# Patient Record
Sex: FEMALE | Race: WHITE | Employment: FULL TIME | URBAN - METROPOLITAN AREA
[De-identification: names, ages, dates, MRNs, and addresses within clinical notes are randomized per-mention and may not be internally consistent; named-entity substitution may affect disease eponyms.]

---

## 2018-03-15 ENCOUNTER — HOSPITAL ENCOUNTER (OUTPATIENT)
Dept: MRI IMAGING | Age: 48
Discharge: HOME | End: 2018-03-15
Attending: INTERNAL MEDICINE | Admitting: INTERNAL MEDICINE

## 2018-03-15 DIAGNOSIS — M48.00 SPINAL STENOSIS, UNSPECIFIED SPINAL REGION: ICD-10-CM

## 2018-03-15 DIAGNOSIS — M48.00 SPINAL STENOSIS: ICD-10-CM

## 2019-06-28 ENCOUNTER — HOSPITAL ENCOUNTER (OUTPATIENT)
Dept: MAMMOGRAPHY | Age: 49
Discharge: HOME | End: 2019-07-03
Payer: COMMERCIAL

## 2019-06-28 DIAGNOSIS — Z12.31 VISIT FOR SCREENING MAMMOGRAM: ICD-10-CM

## 2019-06-28 PROCEDURE — 77067 SCR MAMMO BI INCL CAD: CPT

## 2020-11-03 PROBLEM — R07.9 CHEST PAIN: Status: RESOLVED | Noted: 2020-11-03 | Resolved: 2020-11-03

## 2021-02-25 ENCOUNTER — HOSPITAL ENCOUNTER (OUTPATIENT)
Dept: WOMENS IMAGING | Age: 51
Discharge: HOME | End: 2021-02-25
Payer: COMMERCIAL

## 2021-02-25 DIAGNOSIS — Z12.31 BREAST CANCER SCREENING BY MAMMOGRAM: ICD-10-CM

## 2021-02-25 PROCEDURE — 77063 BREAST TOMOSYNTHESIS BI: CPT

## 2023-03-02 SDOH — HEALTH STABILITY: PHYSICAL HEALTH: ON AVERAGE, HOW MANY DAYS PER WEEK DO YOU ENGAGE IN MODERATE TO STRENUOUS EXERCISE (LIKE A BRISK WALK)?: 1 DAY

## 2023-03-02 ASSESSMENT — SOCIAL DETERMINANTS OF HEALTH (SDOH)
WITHIN THE LAST YEAR, HAVE YOU BEEN KICKED, HIT, SLAPPED, OR OTHERWISE PHYSICALLY HURT BY YOUR PARTNER OR EX-PARTNER?: NO
WITHIN THE LAST YEAR, HAVE YOU BEEN HUMILIATED OR EMOTIONALLY ABUSED IN OTHER WAYS BY YOUR PARTNER OR EX-PARTNER?: YES
WITHIN THE LAST YEAR, HAVE YOU BEEN AFRAID OF YOUR PARTNER OR EX-PARTNER?: NO
WITHIN THE LAST YEAR, HAVE TO BEEN RAPED OR FORCED TO HAVE ANY KIND OF SEXUAL ACTIVITY BY YOUR PARTNER OR EX-PARTNER?: NO

## 2023-03-03 ENCOUNTER — OFFICE VISIT (OUTPATIENT)
Dept: INTERNAL MEDICINE CLINIC | Age: 53
End: 2023-03-03

## 2023-03-03 VITALS
OXYGEN SATURATION: 95 % | HEIGHT: 66 IN | DIASTOLIC BLOOD PRESSURE: 100 MMHG | BODY MASS INDEX: 35.87 KG/M2 | SYSTOLIC BLOOD PRESSURE: 152 MMHG | HEART RATE: 81 BPM | WEIGHT: 223.2 LBS

## 2023-03-03 DIAGNOSIS — M50.30 DDD (DEGENERATIVE DISC DISEASE), CERVICAL: ICD-10-CM

## 2023-03-03 DIAGNOSIS — Z72.0 TOBACCO USE: ICD-10-CM

## 2023-03-03 DIAGNOSIS — F41.1 GAD (GENERALIZED ANXIETY DISORDER): ICD-10-CM

## 2023-03-03 DIAGNOSIS — I10 ESSENTIAL HYPERTENSION: ICD-10-CM

## 2023-03-03 DIAGNOSIS — M25.562 ARTHRALGIA OF KNEE, LEFT: ICD-10-CM

## 2023-03-03 DIAGNOSIS — N95.1 VASOMOTOR SYMPTOMS DUE TO MENOPAUSE: ICD-10-CM

## 2023-03-03 DIAGNOSIS — Z12.31 BREAST CANCER SCREENING BY MAMMOGRAM: ICD-10-CM

## 2023-03-03 DIAGNOSIS — Z00.00 ANNUAL PHYSICAL EXAM: Primary | ICD-10-CM

## 2023-03-03 DIAGNOSIS — E66.9 OBESITY (BMI 30-39.9): ICD-10-CM

## 2023-03-03 DIAGNOSIS — Z12.11 COLON CANCER SCREENING: ICD-10-CM

## 2023-03-03 RX ORDER — VALSARTAN AND HYDROCHLOROTHIAZIDE 160; 25 MG/1; MG/1
1 TABLET ORAL DAILY
Qty: 90 TABLET | Refills: 1 | Status: SHIPPED | OUTPATIENT
Start: 2023-03-03

## 2023-03-03 RX ORDER — BUPROPION HYDROCHLORIDE 75 MG/1
75 TABLET ORAL 2 TIMES DAILY
Qty: 60 TABLET | Refills: 3 | Status: SHIPPED | OUTPATIENT
Start: 2023-03-03

## 2023-03-03 SDOH — ECONOMIC STABILITY: INCOME INSECURITY: HOW HARD IS IT FOR YOU TO PAY FOR THE VERY BASICS LIKE FOOD, HOUSING, MEDICAL CARE, AND HEATING?: NOT HARD AT ALL

## 2023-03-03 SDOH — ECONOMIC STABILITY: FOOD INSECURITY: WITHIN THE PAST 12 MONTHS, THE FOOD YOU BOUGHT JUST DIDN'T LAST AND YOU DIDN'T HAVE MONEY TO GET MORE.: NEVER TRUE

## 2023-03-03 SDOH — ECONOMIC STABILITY: FOOD INSECURITY: WITHIN THE PAST 12 MONTHS, YOU WORRIED THAT YOUR FOOD WOULD RUN OUT BEFORE YOU GOT MONEY TO BUY MORE.: NEVER TRUE

## 2023-03-03 SDOH — ECONOMIC STABILITY: HOUSING INSECURITY
IN THE LAST 12 MONTHS, WAS THERE A TIME WHEN YOU DID NOT HAVE A STEADY PLACE TO SLEEP OR SLEPT IN A SHELTER (INCLUDING NOW)?: NO

## 2023-03-03 ASSESSMENT — ENCOUNTER SYMPTOMS
SORE THROAT: 0
CONSTIPATION: 0
NAUSEA: 0
WHEEZING: 0
VOMITING: 0
CHEST TIGHTNESS: 0
SINUS PRESSURE: 0
COLOR CHANGE: 0
COUGH: 0
BACK PAIN: 0
ABDOMINAL PAIN: 0
SHORTNESS OF BREATH: 1

## 2023-03-03 ASSESSMENT — PATIENT HEALTH QUESTIONNAIRE - PHQ9
SUM OF ALL RESPONSES TO PHQ QUESTIONS 1-9: 0
2. FEELING DOWN, DEPRESSED OR HOPELESS: 0
1. LITTLE INTEREST OR PLEASURE IN DOING THINGS: 0
SUM OF ALL RESPONSES TO PHQ QUESTIONS 1-9: 0
SUM OF ALL RESPONSES TO PHQ9 QUESTIONS 1 & 2: 0
SUM OF ALL RESPONSES TO PHQ QUESTIONS 1-9: 0
SUM OF ALL RESPONSES TO PHQ QUESTIONS 1-9: 0

## 2023-03-03 NOTE — ASSESSMENT & PLAN NOTE
Patient has been dependent on Xanax for long time given to her by previous PCP, I had prolonged discussion and explained to patient office policy in regards to controlled substance prescriptions and need to avoid benzodiazepines for long-term management of an anxiety. Advised given the fact that she is dependent on it at this point would recommend gradually weaning off and introducing another noncontrolled substance. Patient is agreeable with the treatment plan, she was on Wellbutrin in the past and we discussed this will probably be a good option to retry since it will also help with nicotine cravings and hopefully help her with smoking cessation. She understand that she is going to start taking Xanax 3 times a day instead of 4 times for the next 2 weeks then she will drop to twice daily or cut the dose in half and continue gradually weaning down until able to come completely off, if needed I will provide 1 more refill however it will be half of her regular supply to last her until able to complete the weaning process.   We will start Wellbutrin 75 mg daily for the first week then she will upped the dose to twice daily and will reevaluate in 4 weeks

## 2023-03-03 NOTE — ASSESSMENT & PLAN NOTE
We will check x-ray to assess status of arthritis since symptoms are chronic and patient reports grinding sensation when taking stairs, recommendations for weight loss made to patient, can use as needed NSAIDs, can consider physical therapy, if x-ray consistent with significant arthritic changes will refer to orthopedic surgery to evaluate for intra-articular injections

## 2023-03-03 NOTE — ASSESSMENT & PLAN NOTE
Blood pressure checked twice and remained elevated, patient admits to occasionally forgetting some of her medications, recommended to use it weekly pill dispenser, reinforced recommendations for salt restriction, smoking cessation, weight reduction, healthy diet and regular exercise as other means to help control blood pressure, she will keep ambulatory blood pressure check and return with her readings to the office in 4 weeks, if still not at target goal with taking meds regularly then will need meds adjustment

## 2023-03-03 NOTE — ASSESSMENT & PLAN NOTE
Counseled at length regarding need for smoking cessation, she is aware of available options to help her quit, will start Wellbutrin as noted above to treat both anxiety and hopefully help with smoking cessation, reevaluate in 4 weeks

## 2023-03-03 NOTE — ASSESSMENT & PLAN NOTE
Patient reporting hot flashes, insomnia and mood swings along with brain fog, wants to know if she can go on estrogen replacement, explained to patient at length she is not a candidate for estrogen replacement given history of heavy smoking that will increase risk for thromboembolic events.   Counseled at length regarding smoking cessation, advised to ensure adequate hydration and exercise on regular basis, hopefully Wellbutrin will help with some of the symptoms, will refer to GYN for further evaluation and treatment options if needed

## 2023-03-03 NOTE — ASSESSMENT & PLAN NOTE
Age-related health maintenance and immunization recommendations reviewed and discussed with patient at length  Recommended to complete pneumonia vaccine given smoking history, update Tdap and flu vaccine in the fall.   Reports she will think about it and will update next visit if decides to go through  Labs ordered, healthy diet and regular exercise recommendations made to patient, BMI recommendations discussed with patient  Patient overdue for mammography and orders placed  Patient overdue for colonoscopy, GI referral placed  Patient overdue for Pap/pelvic exam, reports she has been postmenopausal for over a year now and battling hot flashes,  see below, she will schedule appointment with GYN to complete Pap  Depression screen is negative  Use of sunscreen and periodic skin exam recommendations made to patient

## 2023-03-03 NOTE — PROGRESS NOTES
ASSESSMENT/PLAN:  1. Annual physical exam  Assessment & Plan:   Age-related health maintenance and immunization recommendations reviewed and discussed with patient at length  Recommended to complete pneumonia vaccine given smoking history, update Tdap and flu vaccine in the fall. Reports she will think about it and will update next visit if decides to go through  Labs ordered, healthy diet and regular exercise recommendations made to patient, BMI recommendations discussed with patient  Patient overdue for mammography and orders placed  Patient overdue for colonoscopy, GI referral placed  Patient overdue for Pap/pelvic exam, reports she has been postmenopausal for over a year now and battling hot flashes,  see below, she will schedule appointment with GYN to complete Pap  Depression screen is negative  Use of sunscreen and periodic skin exam recommendations made to patient  Orders:  -     Hemoglobin A1C; Future  -     Lipid Panel; Future  -     CBC; Future  -     Comprehensive Metabolic Panel; Future  -     TSH with Reflex to FT4; Future  -     Urinalysis with Reflex to Culture; Future  -     Hepatitis C Antibody; Future  -     HIV Screen; Future  -     AFL - Mitul Deutsch MD, Gastroenterology, [facility]  2. Essential hypertension  Assessment & Plan:   Blood pressure checked twice and remained elevated, patient admits to occasionally forgetting some of her medications, recommended to use it weekly pill dispenser, reinforced recommendations for salt restriction, smoking cessation, weight reduction, healthy diet and regular exercise as other means to help control blood pressure, she will keep ambulatory blood pressure check and return with her readings to the office in 4 weeks, if still not at target goal with taking meds regularly then will need meds adjustment  Orders:  -     valsartan-hydroCHLOROthiazide (DIOVAN-HCT) 160-25 MG per tablet; Take 1 tablet by mouth daily, Disp-90 tablet, R-1Normal  3.  LENORA (generalized anxiety disorder)  Assessment & Plan:   Patient has been dependent on Xanax for long time given to her by previous PCP, I had prolonged discussion and explained to patient office policy in regards to controlled substance prescriptions and need to avoid benzodiazepines for long-term management of an anxiety. Advised given the fact that she is dependent on it at this point would recommend gradually weaning off and introducing another noncontrolled substance. Patient is agreeable with the treatment plan, she was on Wellbutrin in the past and we discussed this will probably be a good option to retry since it will also help with nicotine cravings and hopefully help her with smoking cessation. She understand that she is going to start taking Xanax 3 times a day instead of 4 times for the next 2 weeks then she will drop to twice daily or cut the dose in half and continue gradually weaning down until able to come completely off, if needed I will provide 1 more refill however it will be half of her regular supply to last her until able to complete the weaning process. We will start Wellbutrin 75 mg daily for the first week then she will upped the dose to twice daily and will reevaluate in 4 weeks  Orders:  -     buPROPion (WELLBUTRIN) 75 MG tablet; Take 1 tablet by mouth 2 times daily, Disp-60 tablet, R-3Normal  4. DDD (degenerative disc disease), cervical  Assessment & Plan:   S/p cervical fusion 2018 with resolution of acute pain and symptoms however continues to use gabapentin average once or twice a day for cervical radiculopathy. Symptoms been stable and manageable, will continue same  5.  Tobacco use  Assessment & Plan:   Counseled at length regarding need for smoking cessation, she is aware of available options to help her quit, will start Wellbutrin as noted above to treat both anxiety and hopefully help with smoking cessation, reevaluate in 4 weeks  Orders:  -     buPROPion (WELLBUTRIN) 75 MG tablet; Take 1 tablet by mouth 2 times daily, Disp-60 tablet, R-3Normal  6. Obesity (BMI 30-39. 9)  Assessment & Plan:  Counseled regarding need for weight loss and maintaining active lifestyle and healthy diet  7. Breast cancer screening by mammogram  -     [facility] LUIS DIGITAL SCREEN BILATERAL; Future  8. Vasomotor symptoms due to menopause  Assessment & Plan:   Patient reporting hot flashes, insomnia and mood swings along with brain fog, wants to know if she can go on estrogen replacement, explained to patient at length she is not a candidate for estrogen replacement given history of heavy smoking that will increase risk for thromboembolic events. Counseled at length regarding smoking cessation, advised to ensure adequate hydration and exercise on regular basis, hopefully Wellbutrin will help with some of the symptoms, will refer to GYN for further evaluation and treatment options if needed  Orders:  -     Florencia Richards MD, Gynecology, [facility]  9. Colon cancer screening  -     AFL - Go, Lanette Bragg MD, Gastroenterology, [facility]  10. Arthralgia of knee, left  Assessment & Plan: We will check x-ray to assess status of arthritis since symptoms are chronic and patient reports grinding sensation when taking stairs, recommendations for weight loss made to patient, can use as needed NSAIDs, can consider physical therapy, if x-ray consistent with significant arthritic changes will refer to orthopedic surgery to evaluate for intra-articular injections  Orders:  -     XR KNEE LEFT (1-2 VIEWS); Future    Return in about 4 weeks (around 3/31/2023).      SUBJECTIVE  HPI:   Patient here to establish new PCP office visits, used to see Dr. 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 HENT:  Positive for congestion. Negative for hearing loss, mouth sores, sinus pressure and sore throat. Respiratory:  Positive for shortness of breath. Negative for cough, chest tightness and wheezing. Cardiovascular:  Negative for chest pain, palpitations and leg swelling. Gastrointestinal:  Negative for abdominal pain, constipation, nausea and vomiting. Genitourinary:  Positive for menstrual problem. Negative for difficulty urinating, dysuria, frequency, hematuria, urgency, vaginal bleeding and vaginal discharge. Musculoskeletal:  Positive for arthralgias and neck pain. Negative for back pain, gait problem and joint swelling. Skin:  Negative for color change. Allergic/Immunologic: Negative for environmental allergies and immunocompromised state. Neurological:  Negative for dizziness, weakness, light-headedness and headaches. Hematological:  Does not bruise/bleed easily. Psychiatric/Behavioral:  Negative for behavioral problems, decreased concentration, dysphoric mood, hallucinations and sleep disturbance. The patient is nervous/anxious. OBJECTIVE:    BP (!) 152/100   Pulse 81   Ht 5' 6\" (1.676 m)   Wt 223 lb 3.2 oz (101.2 kg)   SpO2 95%   BMI 36.03 kg/m²    Physical Exam  Constitutional:       General: She is not in acute distress. Appearance: Normal appearance. She is obese. She is not toxic-appearing. HENT:      Head: Normocephalic.       Right Ear: Tympanic membrane and ear canal normal.      Left Ear: Tympanic membrane and ear canal normal. Nose: Nose normal.      Mouth/Throat:      Mouth: Mucous membranes are moist.      Pharynx: Oropharynx is clear. Eyes:      Extraocular Movements: Extraocular movements intact. Conjunctiva/sclera: Conjunctivae normal.      Pupils: Pupils are equal, round, and reactive to light. Cardiovascular:      Rate and Rhythm: Normal rate and regular rhythm. Pulses: Normal pulses. Heart sounds: Normal heart sounds. No murmur heard. Pulmonary:      Effort: Pulmonary effort is normal. No respiratory distress. Breath sounds: Normal breath sounds. No wheezing. Abdominal:      General: Bowel sounds are normal.      Palpations: Abdomen is soft. There is no mass. Tenderness: There is no abdominal tenderness. There is no rebound. Musculoskeletal:         General: Tenderness present. No swelling, deformity or signs of injury. Normal range of motion. Cervical back: Normal range of motion and neck supple. Right lower leg: No edema. Left lower leg: No edema. Skin:     General: Skin is warm and dry. Neurological:      General: No focal deficit present. Mental Status: She is alert and oriented to person, place, and time. Mental status is at baseline. Cranial Nerves: No cranial nerve deficit. Gait: Gait normal.   Psychiatric:         Mood and Affect: Mood normal.         Behavior: Behavior normal.         Thought Content: Thought content normal.         Electronically signed by Amirah Mayorga MD on 3/3/2023 at 12:50 PM.    This dictation was generated by voice recognition computer software. Although all attempts are made to edit the dictation for accuracy, there may be errors in the transcription that are not intended.

## 2023-04-02 PROBLEM — Z00.00 ANNUAL PHYSICAL EXAM: Status: RESOLVED | Noted: 2023-03-03 | Resolved: 2023-04-02

## 2023-04-06 DIAGNOSIS — F41.1 GAD (GENERALIZED ANXIETY DISORDER): ICD-10-CM

## 2023-04-06 DIAGNOSIS — Z72.0 TOBACCO USE: ICD-10-CM

## 2023-04-06 RX ORDER — BUPROPION HYDROCHLORIDE 75 MG/1
TABLET ORAL
Qty: 60 TABLET | Refills: 3 | OUTPATIENT
Start: 2023-04-06

## 2023-08-04 ENCOUNTER — TELEPHONE (OUTPATIENT)
Dept: INTERNAL MEDICINE CLINIC | Age: 53
End: 2023-08-04

## 2023-08-04 NOTE — TELEPHONE ENCOUNTER
Left message for patient to make sure to schedule Mammogram and to see if has made appt with [facility].

## 2023-10-25 ENCOUNTER — HOSPITAL ENCOUNTER (OUTPATIENT)
Dept: WOMENS IMAGING | Age: 53
Discharge: HOME | End: 2023-10-25
Payer: COMMERCIAL

## 2023-10-25 VITALS — BODY MASS INDEX: 33.43 KG/M2 | WEIGHT: 208 LBS | HEIGHT: 66 IN

## 2023-10-25 DIAGNOSIS — Z12.31 BREAST CANCER SCREENING BY MAMMOGRAM: ICD-10-CM

## 2023-10-25 PROCEDURE — 77063 BREAST TOMOSYNTHESIS BI: CPT

## 2025-03-18 ENCOUNTER — HOSPITAL ENCOUNTER (OUTPATIENT)
Dept: WOMENS IMAGING | Age: 55
Discharge: HOME | End: 2025-03-18

## 2025-03-18 VITALS — BODY MASS INDEX: 30.22 KG/M2 | WEIGHT: 188 LBS | HEIGHT: 66 IN

## 2025-03-18 DIAGNOSIS — Z12.31 ENCOUNTER FOR SCREENING MAMMOGRAM FOR BREAST CANCER: ICD-10-CM

## 2025-03-28 ENCOUNTER — HOSPITAL ENCOUNTER (OUTPATIENT)
Dept: ULTRASOUND IMAGING | Age: 55
Discharge: HOME | End: 2025-03-28
Payer: COMMERCIAL

## 2025-03-28 ENCOUNTER — HOSPITAL ENCOUNTER (OUTPATIENT)
Dept: WOMENS IMAGING | Age: 55
Discharge: HOME | End: 2025-03-28
Payer: COMMERCIAL

## 2025-03-28 VITALS — WEIGHT: 188 LBS | BODY MASS INDEX: 30.22 KG/M2 | HEIGHT: 66 IN

## 2025-03-28 DIAGNOSIS — N64.59 INVERTED NIPPLE: ICD-10-CM

## 2025-03-28 PROCEDURE — 77066 DX MAMMO INCL CAD BI: CPT

## 2025-03-28 PROCEDURE — 76642 ULTRASOUND BREAST LIMITED: CPT
